# Patient Record
Sex: FEMALE | URBAN - METROPOLITAN AREA
[De-identification: names, ages, dates, MRNs, and addresses within clinical notes are randomized per-mention and may not be internally consistent; named-entity substitution may affect disease eponyms.]

---

## 2020-06-01 ENCOUNTER — WEB ENCOUNTER (OUTPATIENT)
Dept: URBAN - METROPOLITAN AREA CLINIC 92 | Facility: CLINIC | Age: 50
End: 2020-06-01

## 2020-06-08 ENCOUNTER — OFFICE VISIT (OUTPATIENT)
Dept: URBAN - METROPOLITAN AREA SURGERY CENTER 16 | Facility: SURGERY CENTER | Age: 50
End: 2020-06-08
Payer: COMMERCIAL

## 2020-06-08 DIAGNOSIS — K21.0 BILE REFLUX ESOPHAGITIS: ICD-10-CM

## 2020-06-08 DIAGNOSIS — K29.60 ADENOPAPILLOMATOSIS GASTRICA: ICD-10-CM

## 2020-06-08 PROCEDURE — 43239 EGD BIOPSY SINGLE/MULTIPLE: CPT | Performed by: INTERNAL MEDICINE

## 2020-06-08 PROCEDURE — G8907 PT DOC NO EVENTS ON DISCHARG: HCPCS | Performed by: INTERNAL MEDICINE

## 2020-06-08 RX ORDER — OMEPRAZOLE 40 MG/1
TAKE 1 CAPSULE BY ORAL ROUTE 2 TIMES A DAY FOR 30 DAYS 30MINS BEFORE FIRST AND LAST MEALS OF THE DAY CAPSULE, DELAYED RELEASE PELLETS ORAL 2
Qty: 60 | Refills: 11 | Status: ACTIVE | COMMUNITY
Start: 2020-04-02 | End: 2021-03-28

## 2020-06-08 RX ORDER — FAMOTIDINE 40 MG/1
TAKE 1 TABLET (40 MG) BY ORAL ROUTE ONCE DAILY AT BEDTIME FOR 30 DAYS TABLET ORAL 1
Qty: 30 | Refills: 11 | Status: ACTIVE | COMMUNITY
Start: 2020-04-02 | End: 2021-03-28

## 2020-06-15 ENCOUNTER — OFFICE VISIT (OUTPATIENT)
Dept: URBAN - METROPOLITAN AREA TELEHEALTH 2 | Facility: TELEHEALTH | Age: 50
End: 2020-06-15
Payer: COMMERCIAL

## 2020-06-15 DIAGNOSIS — R10.13 DYSPEPSIA: ICD-10-CM

## 2020-06-15 DIAGNOSIS — K29.50 CHRONIC GASTRITIS WITHOUT BLEEDING, UNSPECIFIED GASTRITIS TYPE: ICD-10-CM

## 2020-06-15 DIAGNOSIS — K21.0 REFLUX ESOPHAGITIS: ICD-10-CM

## 2020-06-15 DIAGNOSIS — Z86.010 PERSONAL HISTORY OF COLONIC POLYPS: ICD-10-CM

## 2020-06-15 DIAGNOSIS — K80.20 GALLSTONES: ICD-10-CM

## 2020-06-15 DIAGNOSIS — K29.70 GASTRITIS WITHOUT BLEEDING, UNSPECIFIED CHRONICITY, UNSPECIFIED GASTRITIS TYPE: ICD-10-CM

## 2020-06-15 DIAGNOSIS — R07.89 OTHER CHEST PAIN: ICD-10-CM

## 2020-06-15 DIAGNOSIS — K59.01 SLOW TRANSIT CONSTIPATION: ICD-10-CM

## 2020-06-15 PROBLEM — 162031009: Status: ACTIVE | Noted: 2020-06-14

## 2020-06-15 PROBLEM — 4556007: Status: ACTIVE | Noted: 2020-06-15

## 2020-06-15 PROBLEM — 35298007: Status: ACTIVE | Noted: 2020-06-15

## 2020-06-15 PROCEDURE — G8427 DOCREV CUR MEDS BY ELIG CLIN: HCPCS | Performed by: PHYSICIAN ASSISTANT

## 2020-06-15 PROCEDURE — G8417 CALC BMI ABV UP PARAM F/U: HCPCS | Performed by: PHYSICIAN ASSISTANT

## 2020-06-15 PROCEDURE — 3017F COLORECTAL CA SCREEN DOC REV: CPT | Performed by: PHYSICIAN ASSISTANT

## 2020-06-15 PROCEDURE — 99214 OFFICE O/P EST MOD 30 MIN: CPT | Performed by: PHYSICIAN ASSISTANT

## 2020-06-15 RX ORDER — FAMOTIDINE 40 MG/1
TAKE 1 TABLET (40 MG) BY ORAL ROUTE ONCE DAILY AT BEDTIME FOR 30 DAYS TABLET ORAL 1
Qty: 30 | Refills: 11 | Status: ACTIVE | COMMUNITY
Start: 2020-04-02 | End: 2021-03-28

## 2020-06-15 RX ORDER — OMEPRAZOLE 40 MG/1
TAKE 1 CAPSULE BY ORAL ROUTE 2 TIMES A DAY FOR 30 DAYS 30MINS BEFORE FIRST AND LAST MEALS OF THE DAY CAPSULE, DELAYED RELEASE PELLETS ORAL 2
Qty: 60 | Refills: 11 | Status: ACTIVE | COMMUNITY
Start: 2020-04-02 | End: 2021-03-28

## 2020-06-15 RX ORDER — SUCRALFATE 1 G/10ML
10 ML ON AN EMPTY STOMACH SUSPENSION ORAL
Qty: 1200 ML | OUTPATIENT

## 2020-06-15 NOTE — HPI-TODAY'S VISIT:
49yoF presents for f/u of atypical chest pain, epi pain and dyspepsia Notes 9 sudden, intense episodes of sternal pain that "knock me down"  Last for 45min  No agg factors  Also has stomach discomfort X years  Began after "trauma" of sister going to longterm and worse with all feelings (happy, sad) improved with tea and soda +Heartburn/indigestion daily X 1.5 years, worse in AM after breakfast +gas, dyspepsia Has seen Dr. Castillo in the past for these problems  Taking omeprazole 40mg/day X 3m  No significant change in stomach discomfort 2 "severe episodes" of sternal pain since being on PPI--7 between dec and March and 2 since March  Seen prior at [facility] on 3/1/20 Labs taken - WNL  Given carafate but did not help   20 RUQ US + for cholelithiasis and sludge -- referred to Dr. Gonsalez for consultation for cholecystectomy --had telehealth apt with him--f/u scheduled with him  EGD 2020 grade A esophagitis and gastritis, bx pending   colonoscopy 10y ago 2-3 polyps removed  Scheduled colon for August Mild constipation, chronic, without GIB  Patient seen today via telehealth by agreement and consent of patient in light of current COVID-19 pandemic. I used video conferencing during the visit. The patient encounter is appropriate and reasonable under the circumstances given the patient's particular presentation at this time. The patient has been advised of the followin) the potential risks and limitations of this mode of treatment (including but not limited to the absence of in-person examination); 2) the right to refuse telehealth services at any point without affecting the right to future care; 3) the right to receive in-person services, included immediately after this consultation if an urgent need arises; 4) information, including identifiable images or information from this telehealth consult, will only be shared in accordance with HIPAA regulations. Any and all of the patient's and/or patient's family member's questions on this issue have been answered. The patient has verbally consented to be treated via telehealth services. The patient has also been advised to contact this office for worsening conditions or problems, and seek emergency medical treatment and/or call 911 if the patient deems either necessary.

## 2020-06-22 ENCOUNTER — TELEPHONE ENCOUNTER (OUTPATIENT)
Dept: URBAN - METROPOLITAN AREA CLINIC 92 | Facility: CLINIC | Age: 50
End: 2020-06-22

## 2020-06-22 RX ORDER — ONDANSETRON HYDROCHLORIDE 4 MG/1
1 TABLET TABLET, FILM COATED ORAL ONCE A DAY
Qty: 30 | OUTPATIENT
Start: 2020-06-22

## 2020-06-22 RX ORDER — SUCRALFATE 1 G/10ML
10 ML ON AN EMPTY STOMACH SUSPENSION ORAL
Qty: 1200 ML | OUTPATIENT
Start: 2020-06-22 | End: 2020-07-22

## 2020-06-24 ENCOUNTER — LAB OUTSIDE AN ENCOUNTER (OUTPATIENT)
Dept: URBAN - METROPOLITAN AREA CLINIC 92 | Facility: CLINIC | Age: 50
End: 2020-06-24

## 2020-06-24 ENCOUNTER — OFFICE VISIT (OUTPATIENT)
Dept: URBAN - METROPOLITAN AREA CLINIC 92 | Facility: CLINIC | Age: 50
End: 2020-06-24
Payer: COMMERCIAL

## 2020-06-24 DIAGNOSIS — K21.0 REFLUX ESOPHAGITIS: ICD-10-CM

## 2020-06-24 DIAGNOSIS — R11.0 NAUSEA: ICD-10-CM

## 2020-06-24 DIAGNOSIS — K29.50 CHRONIC GASTRITIS WITHOUT BLEEDING, UNSPECIFIED GASTRITIS TYPE: ICD-10-CM

## 2020-06-24 DIAGNOSIS — R07.89 OTHER CHEST PAIN: ICD-10-CM

## 2020-06-24 DIAGNOSIS — R11.2 NON-INTRACTABLE VOMITING WITH NAUSEA, UNSPECIFIED VOMITING TYPE: ICD-10-CM

## 2020-06-24 DIAGNOSIS — R10.13 EPIGASTRIC PAIN: ICD-10-CM

## 2020-06-24 DIAGNOSIS — K80.20 GALLSTONES: ICD-10-CM

## 2020-06-24 DIAGNOSIS — Z86.010 PERSONAL HISTORY OF COLONIC POLYPS: ICD-10-CM

## 2020-06-24 DIAGNOSIS — R12 HEARTBURN: ICD-10-CM

## 2020-06-24 PROBLEM — 266433003: Status: ACTIVE | Noted: 2020-06-14

## 2020-06-24 PROBLEM — 16932000: Status: ACTIVE | Noted: 2020-06-24

## 2020-06-24 PROBLEM — 29857009: Status: ACTIVE | Noted: 2020-06-14

## 2020-06-24 PROBLEM — 422587007: Status: ACTIVE | Noted: 2020-06-23

## 2020-06-24 PROBLEM — 428283002: Status: ACTIVE | Noted: 2020-06-14

## 2020-06-24 PROBLEM — 8493009: Status: ACTIVE | Noted: 2020-06-14

## 2020-06-24 PROCEDURE — G8427 DOCREV CUR MEDS BY ELIG CLIN: HCPCS | Performed by: PHYSICIAN ASSISTANT

## 2020-06-24 PROCEDURE — G8420 CALC BMI NORM PARAMETERS: HCPCS | Performed by: PHYSICIAN ASSISTANT

## 2020-06-24 PROCEDURE — 99214 OFFICE O/P EST MOD 30 MIN: CPT | Performed by: PHYSICIAN ASSISTANT

## 2020-06-24 PROCEDURE — 3017F COLORECTAL CA SCREEN DOC REV: CPT | Performed by: PHYSICIAN ASSISTANT

## 2020-06-24 RX ORDER — SUCRALFATE 1 G/10ML
10 ML ON AN EMPTY STOMACH SUSPENSION ORAL
Qty: 1200 ML | COMMUNITY

## 2020-06-24 RX ORDER — FAMOTIDINE 40 MG/1
TAKE 1 TABLET (40 MG) BY ORAL ROUTE ONCE DAILY AT BEDTIME FOR 30 DAYS TABLET ORAL 1
Qty: 30 | Refills: 11 | COMMUNITY
Start: 2020-04-02 | End: 2021-03-28

## 2020-06-24 RX ORDER — SUCRALFATE 1 G/10ML
10 ML ON AN EMPTY STOMACH SUSPENSION ORAL
Qty: 1200 ML | COMMUNITY
Start: 2020-06-22 | End: 2020-07-22

## 2020-06-24 RX ORDER — ONDANSETRON HYDROCHLORIDE 4 MG/1
1 TABLET TABLET, FILM COATED ORAL ONCE A DAY
Qty: 30 | COMMUNITY
Start: 2020-06-22

## 2020-06-24 RX ORDER — OMEPRAZOLE 40 MG/1
TAKE 1 CAPSULE BY ORAL ROUTE 2 TIMES A DAY FOR 30 DAYS 30MINS BEFORE FIRST AND LAST MEALS OF THE DAY CAPSULE, DELAYED RELEASE PELLETS ORAL 2
Qty: 60 | Refills: 11 | Status: ACTIVE | COMMUNITY
Start: 2020-04-02 | End: 2021-03-28

## 2020-06-24 NOTE — HPI-TODAY'S VISIT:
49yoF presents for f/u of atypical chest pain, epi pain and dyspepsia + 10 sudden, intense episodes of sternal pain that "knock me down"  Last for 45min    No agg factors  MOst recent episode Sunday night  3.5 hours after eating chinese had onset of severe epi/LUQ pain Pain lasted for 2 3hours but was less acute after 1 hour The next day had N/V and diarrhea 7/day, non-bloody Associated eructation  This episode was similar to prior severe episodes but had associated fullness prior to the pain which is new No fevers or jaundice Improved finally with levsin and tylenol  +  stomach discomfort X years  Began after "trauma" of sister going to [facility] and worse with all feelings (happy, sad) improved with tea and soda +Heartburn/indigestion daily X 1.5 years, worse in AM after breakfast Improved some with PPI Started carafate this AM +gas, dyspepsia Has seen Dr. Castillo in the past for these problems  Taking omeprazole 40mg/day X 3m  No significant change in stomach discomfort 2 "severe episodes" of sternal pain since being on PPI--7 between dec and March and 2 since March  Seen prior at [facility] on 3/1/20 Labs taken - WNL   4/8/20 RUQ US + for multiple stones/ cholelithiasis and sludge +1.5cm sludge ball -- referred to Dr. Gonsalez for consultation for cholecystectomy --had telehealth apt with him--f/u scheduled with him  EGD 6/8/2020 grade A esophagitis and gastritis, bx chronic gastritis without HP and neg celiac  colonoscopy 10y ago 2-3 polyps removed  Scheduled colon for August Mild constipation, chronic, without GIB

## 2020-06-25 ENCOUNTER — TELEPHONE ENCOUNTER (OUTPATIENT)
Dept: URBAN - METROPOLITAN AREA CLINIC 92 | Facility: CLINIC | Age: 50
End: 2020-06-25

## 2020-06-25 LAB
A/G RATIO: 1.9
ALBUMIN: 4.5
ALKALINE PHOSPHATASE: 121
ALT (SGPT): 636
AST (SGOT): 189
BILIRUBIN, TOTAL: 0.4
BUN/CREATININE RATIO: 11
BUN: 12
CALCIUM: 9.4
CARBON DIOXIDE, TOTAL: 24
CHLORIDE: 104
CREATININE: 1.05
EGFR IF AFRICN AM: 72
EGFR IF NONAFRICN AM: 62
GLOBULIN, TOTAL: 2.4
GLUCOSE: 96
HEMATOCRIT: 36.8
HEMOGLOBIN: 11.4
LIPASE: 41
MCH: 24.5
MCHC: 31
MCV: 79
NRBC: (no result)
PLATELETS: 193
POTASSIUM: 4
PROTEIN, TOTAL: 6.9
RBC: 4.66
RDW: 14.2
SODIUM: 142
WBC: 5.6

## 2020-06-26 ENCOUNTER — LAB OUTSIDE AN ENCOUNTER (OUTPATIENT)
Dept: URBAN - METROPOLITAN AREA CLINIC 92 | Facility: CLINIC | Age: 50
End: 2020-06-26

## 2020-07-01 LAB
ACTIN (SMOOTH MUSCLE) ANTIBODY: 16
ALBUMIN: 4.8
ALKALINE PHOSPHATASE: 115
ALPHA-1-ANTITRYPSIN, SERUM: 140
ALT (SGPT): 354
ANA DIRECT: NEGATIVE
AST (SGOT): 66
BILIRUBIN, DIRECT: 0.16
BILIRUBIN, TOTAL: 0.5
CERULOPLASMIN: 23.8
FERRITIN, SERUM: 11
HBSAG SCREEN: NEGATIVE
HCV LOG10: (no result)
HEP A AB, IGM: NEGATIVE
HEP B CORE AB, TOT: NEGATIVE
HEPATITIS B SURF AB QUANT: 169.1
HEPATITIS C QUANTITATION: (no result)
IRON BIND.CAP.(TIBC): 320
IRON SATURATION: 18
IRON: 57
MITOCHONDRIAL (M2) ANTIBODY: <20
PHENOTYPE (PI): (no result)
PROTEIN, TOTAL: 7.1
TEST INFORMATION:: (no result)
UIBC: 263

## 2020-07-02 ENCOUNTER — LAB OUTSIDE AN ENCOUNTER (OUTPATIENT)
Dept: URBAN - METROPOLITAN AREA CLINIC 92 | Facility: CLINIC | Age: 50
End: 2020-07-02

## 2020-07-13 ENCOUNTER — OFFICE VISIT (OUTPATIENT)
Dept: URBAN - METROPOLITAN AREA TELEHEALTH 2 | Facility: TELEHEALTH | Age: 50
End: 2020-07-13
Payer: COMMERCIAL

## 2020-07-13 ENCOUNTER — DASHBOARD ENCOUNTERS (OUTPATIENT)
Age: 50
End: 2020-07-13

## 2020-07-13 DIAGNOSIS — R10.13 EPIGASTRIC PAIN: ICD-10-CM

## 2020-07-13 DIAGNOSIS — R79.89 ELEVATED LFTS: ICD-10-CM

## 2020-07-13 DIAGNOSIS — K80.20 GALLSTONES: ICD-10-CM

## 2020-07-13 DIAGNOSIS — R12 HEARTBURN: ICD-10-CM

## 2020-07-13 PROBLEM — 166603001: Status: ACTIVE | Noted: 2020-07-13

## 2020-07-13 PROBLEM — 235919008: Status: ACTIVE | Noted: 2020-06-15

## 2020-07-13 PROBLEM — 16331000: Status: ACTIVE | Noted: 2020-06-23

## 2020-07-13 PROBLEM — 79922009: Status: ACTIVE | Noted: 2020-06-14

## 2020-07-13 PROCEDURE — 3017F COLORECTAL CA SCREEN DOC REV: CPT | Performed by: PHYSICIAN ASSISTANT

## 2020-07-13 PROCEDURE — G8420 CALC BMI NORM PARAMETERS: HCPCS | Performed by: PHYSICIAN ASSISTANT

## 2020-07-13 PROCEDURE — 1036F TOBACCO NON-USER: CPT | Performed by: PHYSICIAN ASSISTANT

## 2020-07-13 PROCEDURE — 99214 OFFICE O/P EST MOD 30 MIN: CPT | Performed by: PHYSICIAN ASSISTANT

## 2020-07-13 PROCEDURE — G8427 DOCREV CUR MEDS BY ELIG CLIN: HCPCS | Performed by: PHYSICIAN ASSISTANT

## 2020-07-13 PROCEDURE — G9903 PT SCRN TBCO ID AS NON USER: HCPCS | Performed by: PHYSICIAN ASSISTANT

## 2020-07-13 RX ORDER — FAMOTIDINE 40 MG/1
TAKE 1 TABLET (40 MG) BY ORAL ROUTE ONCE DAILY AT BEDTIME FOR 30 DAYS TABLET ORAL 1
Qty: 30 | Refills: 11 | COMMUNITY
Start: 2020-04-02 | End: 2021-03-28

## 2020-07-13 RX ORDER — OMEPRAZOLE 40 MG/1
TAKE 1 CAPSULE BY ORAL ROUTE 2 TIMES A DAY FOR 30 DAYS 30MINS BEFORE FIRST AND LAST MEALS OF THE DAY CAPSULE, DELAYED RELEASE PELLETS ORAL 2
Qty: 60 | Refills: 11 | Status: ACTIVE | COMMUNITY
Start: 2020-04-02 | End: 2021-03-28

## 2020-07-13 RX ORDER — SUCRALFATE 1 G/10ML
10 ML ON AN EMPTY STOMACH SUSPENSION ORAL
Qty: 1200 ML | COMMUNITY
Start: 2020-06-22 | End: 2020-07-22

## 2020-07-13 RX ORDER — ONDANSETRON HYDROCHLORIDE 4 MG/1
1 TABLET TABLET, FILM COATED ORAL ONCE A DAY
Qty: 30 | COMMUNITY
Start: 2020-06-22

## 2020-07-13 RX ORDER — SUCRALFATE 1 G/10ML
10 ML ON AN EMPTY STOMACH SUSPENSION ORAL
Qty: 1200 ML | COMMUNITY

## 2020-08-01 ENCOUNTER — ERX REFILL RESPONSE (OUTPATIENT)
Dept: URBAN - METROPOLITAN AREA CLINIC 92 | Facility: CLINIC | Age: 50
End: 2020-08-01

## 2020-08-01 RX ORDER — SUCRALFATE ORAL 1 G/10ML
TAKE 10 ML BY MOUTH ON AN EMPTY STOMACH 4 TIMES A DAY SUSPENSION ORAL
Qty: 3600 MILLILITER | Refills: 0 | OUTPATIENT

## 2020-08-01 RX ORDER — SUCRALFATE 1 G/10ML
10 ML ON AN EMPTY STOMACH SUSPENSION ORAL
Qty: 1200 | Refills: 0 | OUTPATIENT

## 2020-08-24 ENCOUNTER — OFFICE VISIT (OUTPATIENT)
Dept: URBAN - METROPOLITAN AREA SURGERY CENTER 16 | Facility: SURGERY CENTER | Age: 50
End: 2020-08-24

## 2020-10-26 ENCOUNTER — OFFICE VISIT (OUTPATIENT)
Dept: URBAN - METROPOLITAN AREA SURGERY CENTER 16 | Facility: SURGERY CENTER | Age: 50
End: 2020-10-26
Payer: COMMERCIAL

## 2020-10-26 DIAGNOSIS — Z12.11 COLON CANCER SCREENING: ICD-10-CM

## 2020-10-26 PROCEDURE — G8907 PT DOC NO EVENTS ON DISCHARG: HCPCS | Performed by: INTERNAL MEDICINE

## 2020-10-26 PROCEDURE — G9935 CANC NOT DETECTD DURING SRCN: HCPCS | Performed by: INTERNAL MEDICINE

## 2020-10-26 PROCEDURE — G0121 COLON CA SCRN NOT HI RSK IND: HCPCS | Performed by: INTERNAL MEDICINE

## 2021-03-01 NOTE — PHYSICAL EXAM CONSTITUTIONAL:
well developed, well nourished , in no acute distress , ambulating without difficulty , normal communication ability Carlos Shankar)  Surgery  [address] [place] [address]  Phone: [phone]  Fax: [phone]  Follow Up Time:

## 2024-06-05 ENCOUNTER — OFFICE VISIT (OUTPATIENT)
Dept: URBAN - METROPOLITAN AREA CLINIC 96 | Facility: CLINIC | Age: 54
End: 2024-06-05
Payer: COMMERCIAL

## 2024-06-05 ENCOUNTER — WEB ENCOUNTER (OUTPATIENT)
Dept: URBAN - METROPOLITAN AREA CLINIC 96 | Facility: CLINIC | Age: 54
End: 2024-06-05

## 2024-06-05 VITALS
WEIGHT: 139.6 LBS | BODY MASS INDEX: 21.16 KG/M2 | SYSTOLIC BLOOD PRESSURE: 135 MMHG | HEIGHT: 68 IN | DIASTOLIC BLOOD PRESSURE: 90 MMHG | HEART RATE: 84 BPM | TEMPERATURE: 96.3 F

## 2024-06-05 DIAGNOSIS — R14.2 BELCHING: ICD-10-CM

## 2024-06-05 DIAGNOSIS — R14.0 BLOATING: ICD-10-CM

## 2024-06-05 DIAGNOSIS — K21.9 GASTROESOPHAGEAL REFLUX DISEASE, UNSPECIFIED WHETHER ESOPHAGITIS PRESENT: ICD-10-CM

## 2024-06-05 DIAGNOSIS — R10.13 DYSPEPSIA: ICD-10-CM

## 2024-06-05 PROBLEM — 235595009: Status: ACTIVE | Noted: 2024-06-05

## 2024-06-05 PROCEDURE — 99204 OFFICE O/P NEW MOD 45 MIN: CPT | Performed by: INTERNAL MEDICINE

## 2024-06-05 RX ORDER — LORAZEPAM 0.5 MG/1
TAKE ONE TABLET BY MOUTH EVERY EVENING AS NEEDED TABLET ORAL
Qty: 30 UNSPECIFIED | Refills: 0 | Status: ACTIVE | COMMUNITY

## 2024-06-05 RX ORDER — ONDANSETRON HYDROCHLORIDE 4 MG/1
1 TABLET TABLET, FILM COATED ORAL ONCE A DAY
Qty: 30 | Status: DISCONTINUED | COMMUNITY
Start: 2020-06-22

## 2024-06-05 RX ORDER — FLUCONAZOLE 150 MG/1
TAKE ONE TABLET BY MOUTH ONE TIME TODAY. REPEAT IN THREE DAYS IF SYMPTOMS ARE STILL PRESENT TABLET ORAL
Qty: 2 UNSPECIFIED | Refills: 0 | Status: ACTIVE | COMMUNITY

## 2024-06-05 RX ORDER — SUCRALFATE ORAL 1 G/10ML
TAKE 10 ML BY MOUTH ON AN EMPTY STOMACH 4 TIMES A DAY SUSPENSION ORAL
Qty: 3600 MILLILITER | Refills: 0 | Status: DISCONTINUED | COMMUNITY

## 2024-06-05 RX ORDER — LIDOCAINE HYDROCHLORIDE AND HYDROCORTISONE ACETATE 30; 5 MG/G; MG/G
APPLY TO AFFECTED AREA(S) THREE TIMES DAILY AS NEEDED CREAM TOPICAL
Qty: 28.35 UNSPECIFIED | Refills: 1 | Status: ACTIVE | COMMUNITY

## 2024-06-05 RX ORDER — SUCRALFATE 1 G/1
1 TABLET ON AN EMPTY STOMACH TABLET ORAL TWICE A DAY
Qty: 60 | OUTPATIENT
Start: 2024-06-05 | End: 2024-07-05

## 2024-06-05 RX ORDER — BUPROPION HYDROCHLORIDE 150 MG/1
TAKE 1 TABLET BY MOUTH EVERY DAY IN THE MORNING TABLET, FILM COATED, EXTENDED RELEASE ORAL
Qty: 90 EACH | Refills: 3 | Status: ACTIVE | COMMUNITY

## 2024-06-05 RX ORDER — SUCRALFATE 1 G/10ML
10 ML ON AN EMPTY STOMACH SUSPENSION ORAL
Qty: 1200 ML | Status: DISCONTINUED | COMMUNITY

## 2024-06-05 NOTE — PHYSICAL EXAM GASTROINTESTINAL
Abdomen , soft, nontender. scars,, nondistended , no guarding or rigidity , no masses palpable , normal bowel sounds , Liver and Spleen,  no hepatosplenomegaly , liver nontender

## 2024-06-05 NOTE — HPI-TODAY'S VISIT:
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  Typically occurring postprandially with nausea and vomiting. In prior chart note, patient had previously reported pain initiated after "trauma" of sister going to [facility].  Improved with PPI and Carafate.  Prior right upper quadrant ultrasound 4/8/2020 with cholelithiasis, had been referred to surgery for cholecystectomy.   EGD with Dr. Shine 6/8/2020 with grade A esophagitis and gastritis, gastric biopsies negative for H. pylori, duodenal biopsies negative for celiac. Screening colonoscopy with him same date normal. Repeat due 2025 for screening given family history of colon polyps in mother and sister.   CT abdomen and pelvis with contrast 7/6/2020 with right ovarian cyst. Patient underwent cholecystectomy was performed by Dr. Maradiaga 9/2020.  She now presents for nightly episodes, 5 hours after eating, will have very loud belching after eating. She shows me a video of her belching on her phone. No n/v. No dysphagia, no odynophagia. No melena, no unintentional weight loss. No change in BM with some mild baseline issues of constipation. No gum chewing. No sugar free drinks. No food allergies or diet restrictions. Cannot eat tomatoes.   "I dont know if I have GERD" and "I wonder if I am having bile reflux, I am having issues with digestion". No early satiety, no CP or SOB. Took recent course of Prilosec for 14 days which did not help.  Her uncle who is a GI MD suggested she ask about H pylori infection and OTC Carafate. Takes ibuprofen once weekly for 19 years.

## 2024-06-08 ENCOUNTER — WEB ENCOUNTER (OUTPATIENT)
Dept: URBAN - METROPOLITAN AREA CLINIC 96 | Facility: CLINIC | Age: 54
End: 2024-06-08

## 2024-07-11 ENCOUNTER — CLAIMS CREATED FROM THE CLAIM WINDOW (OUTPATIENT)
Dept: URBAN - METROPOLITAN AREA SURGERY CENTER 18 | Facility: SURGERY CENTER | Age: 54
End: 2024-07-11
Payer: COMMERCIAL

## 2024-07-11 ENCOUNTER — CLAIMS CREATED FROM THE CLAIM WINDOW (OUTPATIENT)
Dept: URBAN - METROPOLITAN AREA CLINIC 4 | Facility: CLINIC | Age: 54
End: 2024-07-11
Payer: COMMERCIAL

## 2024-07-11 DIAGNOSIS — K29.60 ADENOPAPILLOMATOSIS GASTRICA: ICD-10-CM

## 2024-07-11 DIAGNOSIS — K31.89 OTHER DISEASES OF STOMACH AND DUODENUM: ICD-10-CM

## 2024-07-11 DIAGNOSIS — K31.A0 GASTRIC INTESTINAL METAPLASIA, UNSPECIFIED: ICD-10-CM

## 2024-07-11 DIAGNOSIS — K21.9 GERD: ICD-10-CM

## 2024-07-11 DIAGNOSIS — K31.7 GASTRIC POLYP: ICD-10-CM

## 2024-07-11 PROCEDURE — 00731 ANES UPR GI NDSC PX NOS: CPT | Performed by: NURSE ANESTHETIST, CERTIFIED REGISTERED

## 2024-07-11 PROCEDURE — 88305 TISSUE EXAM BY PATHOLOGIST: CPT | Performed by: PATHOLOGY

## 2024-07-11 PROCEDURE — 43239 EGD BIOPSY SINGLE/MULTIPLE: CPT | Performed by: INTERNAL MEDICINE

## 2024-07-11 PROCEDURE — 88342 IMHCHEM/IMCYTCHM 1ST ANTB: CPT | Performed by: PATHOLOGY

## 2024-07-18 ENCOUNTER — TELEPHONE ENCOUNTER (OUTPATIENT)
Dept: URBAN - METROPOLITAN AREA CLINIC 96 | Facility: CLINIC | Age: 54
End: 2024-07-18